# Patient Record
Sex: FEMALE | Race: WHITE
[De-identification: names, ages, dates, MRNs, and addresses within clinical notes are randomized per-mention and may not be internally consistent; named-entity substitution may affect disease eponyms.]

---

## 2020-07-20 ENCOUNTER — HOSPITAL ENCOUNTER (EMERGENCY)
Dept: HOSPITAL 62 - ER | Age: 40
Discharge: HOME | End: 2020-07-20
Payer: MEDICAID

## 2020-07-20 VITALS — SYSTOLIC BLOOD PRESSURE: 128 MMHG | DIASTOLIC BLOOD PRESSURE: 83 MMHG

## 2020-07-20 DIAGNOSIS — H92.02: ICD-10-CM

## 2020-07-20 DIAGNOSIS — H60.312: Primary | ICD-10-CM

## 2020-07-20 DIAGNOSIS — R09.89: ICD-10-CM

## 2020-07-20 DIAGNOSIS — R09.81: ICD-10-CM

## 2020-07-20 DIAGNOSIS — H92.12: ICD-10-CM

## 2020-07-20 DIAGNOSIS — F17.210: ICD-10-CM

## 2020-07-20 PROCEDURE — 99282 EMERGENCY DEPT VISIT SF MDM: CPT

## 2020-07-20 NOTE — ER DOCUMENT REPORT
ED ENT





- General


Chief Complaint: Ear Pain


Stated Complaint: EARACHE


Time Seen by Provider: 20 14:00


Mode of Arrival: Ambulatory


Information source: Patient


Notes: 





Patient is a 40-year-old female comes emergency room complaining of left ear 

pain.  For the past week patient has had in ear ache.  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 

that she is having difficulty with her ears.  She denies going swimming.  She 

has been using over-the-counter eardrops with no success to include peroxide.  

She denies any problems with her right ear.  She has had no congestion or runny 

nose.  No nausea vomiting or diarrhea.


TRAVEL OUTSIDE OF THE [place] IN LAST 30 DAYS: No





- HPI


Patient complains to provider of: Ear problem


Onset: Last week


Onset/Duration: Gradual


Quality of pain: Achy


Severity: Moderate


Pain Level: 3


Location of pain: Ears


Associated symptoms: Ear pain, Ear drainage.  denies: Runny nose, Sinus pain, 

Sinus drainage, Sore throat, Tinnitus


Similar symptoms previously: Yes


Recently seen / treated by doctor: No





- Related Data


Allergies/Adverse Reactions: 


                                        





No Known Allergies Allergy (Unverified 20 13:54)


   








Home Medications: denies





Past Medical History





- General


Information source: Patient





- Social History


Smoking Status: Current Every Day Smoker


Cigarette use (# per day): Yes - 1 pack/day


Chew tobacco use (# tins/day): No


Smoking Education Provided: Yes


Frequency of alcohol use: None


Drug Abuse: None


Family History: Reviewed & Not Pertinent


Patient has homicidal ideation: No


Past Surgical History: Reports: Hx  Section - x4, Hx Cholecystectomy, Hx

Hysterectomy, Hx Nose Surgery - nasal recon x3, Hx Orthopedic Surgery - gina 

carpal tunnel, Hx Tubal Ligation - x2 with reversal





Review of Systems





- Review of Systems


Constitutional: No symptoms reported


EENT: Ear pain, Ear discharge


Cardiovascular: No symptoms reported


Respiratory: No symptoms reported


Gastrointestinal: No symptoms reported


Genitourinary: No symptoms reported


Female Genitourinary: No symptoms reported


Musculoskeletal: No symptoms reported


Skin: No symptoms reported


Hematologic/Lymphatic: No symptoms reported


Neurological/Psychological: No symptoms reported





Physical Exam





- Vital signs


Vitals: 





                                        











Temp Pulse Resp BP Pulse Ox


 


 99.5 F   100   20   128/83 H  100 


 


 20 12:22  20 12:22  20 12:20 12:20 12:22











Interpretation: Hypertensive





- Notes


Notes: 





PHYSICAL EXAMINATION:





GENERAL: Well-appearing, well-nourished and in no acute distress.





HEAD: Atraumatic, normocephalic.





EYES: Pupils equal round and reactive to light, extraocular movements intact, 

conjunctiva are normal.





ENT: Examination patient's head and upper airway show nasal mucosa be mildly 

erythematous and edematous with no rhinorrhea noted.  No frontal or maxillary 

sinus tenderness to palpation.  Examination patient's area of concern is her 

left ear.  Patient displays minor amount of discomfort with palpation of the 

tragus as well as the auricle.  Speculum exam shows that patient has some mild 

erythema throughout the external canal.  There is no cerumen noticed at this 

time.  There is a white East circumferential discharge clinging to the external 

canal with a mild odor it is difficult to ascertain in the extent of the TM 

although does not appear to have a perforation.  And there is no bulging noted..

 Further evaluation of the right ear shows it to be normal appearance TMs not 

bulging or retracted.





NECK: Normal range of motion, supple without lymphadenopathy





LUNGS: Breath sounds clear to auscultation bilaterally and equal.  No wheezes 

rales or rhonchi.





HEART: Regular rate and rhythm without murmurs





PSYCH: Normal mood, normal affect.





SKIN: Warm, Dry, normal turgor, no rashes or lesions noted.





Course





- Re-evaluation


Re-evalutation: 





20 14:14


Patient currently has no need for any type of ear flushing.  We will start her 

on Ciprodex and she can follow-up with her PCP when she arrives back home.





- Vital Signs


Vital signs: 





                                        











Temp Pulse Resp BP Pulse Ox


 


 99.5 F   100   20   128/83 H  100 


 


 20 12:20 12:20 12:20 12:20 12:22














Discharge





- Discharge


Clinical Impression: 


Otitis externa


Qualifiers:


 Otitis externa type: diffuse Chronicity: acute Laterality: left Qualified 

Code(s): H60.312 - Diffuse otitis externa, left ear





Condition: Stable


Disposition: HOME, SELF-CARE


Instructions:  Use of Ear Drops ([facility]), Otitis Externa ([facility])


Additional Instructions: 


Home and keep the ear as clean and dry as possible.  Try to avoid using a hair 

dryer and do not use any more peroxide or other drops while with the antibiotic 

drops.  You can take Tylenol or ibuprofen for pain and discomfort as well.  You 

need to follow-up with your primary care on arrival back to your place of 

residence.  May also suggest an ears nose and throat follow-up since this is 

been going on for a while.  Should you have any concerns or problems goes return

to ER for reevaluation.


Prescriptions: 


Ciprofloxacin HCl/Dexameth [Ciprodex Otic Suspension 7.5 ml Bottle] 4 drop OT 

BID #1 bottle


Forms:  Elevated Blood Pressure, Smoking Cessation Education